# Patient Record
Sex: FEMALE | Race: WHITE | NOT HISPANIC OR LATINO | Employment: OTHER | URBAN - METROPOLITAN AREA
[De-identification: names, ages, dates, MRNs, and addresses within clinical notes are randomized per-mention and may not be internally consistent; named-entity substitution may affect disease eponyms.]

---

## 2018-01-11 NOTE — MISCELLANEOUS
History of Present Illness  TCM Communication [facility]: The patient is being contacted for follow-up after hospitalization  [place] [facility] [place]: 9/5/16  She was discharged to home  Medications reviewed and updated today  She refused a follow up appointment due to going toanother dr    Communication performed and completed by      Active Problems     1  Actinic keratosis (702 0) (L57 0)   2  Aortic valvar stenosis (424 1) (I35 0)   3  Aortic valve disorder (424 1) (I35 9)   4  Atrial fibrillation (427 31) (I48 91)   5  Bilateral lower extremity edema (782 3) (R60 0)   6  Cervical radiculitis (723 4) (M54 12)   7  Changing skin lesion (709 9) (L98 9)   8  Chest pain (786 50) (R07 9)   9  Chest tightness or pressure (786 59) (R07 89)   10  Chronic anticoagulation (V58 61) (Z79 01)   11  Cough (786 2) (R05)   12  Daytime sleepiness (780 54) (R40 0)   13  Dementia without behavioral disturbance, unspecified dementia type (294 20) (F03 90)   14  Diabetes mellitus type 2 with complications, uncontrolled (250 92) (E11 8,E11 65)   15  Dizziness (780 4) (R42)   16  Essential hypertension (401 9) (I10)   17  Fatigue (780 79) (R53 83)   18  Flu vaccine need (V04 81) (Z23)   19  Generalized pain (780 96) (R52)   20  Hypercholesterolemia (272 0) (E78 00)   21  Hyperlipidemia (272 4) (E78 5)   22  Hypokalemia (276 8) (E87 6)   23  Inflamed seborrheic keratosis (702 11) (L82 0)   24  Insomnia (780 52) (G47 00)   25  Mitral regurgitation (424 0) (I34 0)   26  Nocturnal enuresis (788 36) (N39 44)   27  Peripheral vascular disease (443 9) (I73 9)   28  Rotator cuff tear arthropathy of right shoulder (716 81) (M12 811)   29  Screening for diabetic retinopathy (V80 2) (Z13 5)   30  Screening for skin condition (V82 0) (Z13 89)   31  Seborrheic keratosis (702 19) (L82 1)   32  Shortness of breath (786 05) (R06 02)   33  Syncope and collapse (780 2) (R55)   34   Urinary tract infection (599 0) (N39 0) 35  Verruca vulgaris (078 10) (B07 9)   36  Viral warts (078 10) (B07 9)    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 (232 4) (D04 4)          Past Medical History    1  History of Abdominal pain, epigastric (789 06) (R10 13)   2  History of Abdominal pain, LLQ (left lower quadrant) (789 04) (R10 32)   3  History of Acute maxillary sinusitis (461 0) (J01 00)   4  History of Benign essential hypertension (401 1) (I10)   5  History of Benign neoplasm of skin of face (216 3) (D23 30)   6  History of Black tarry stools (578 1) (K92 1)   7  History of Cardiovascular Symptoms (785 9)   8  History of Cellulitis (682 9) (L03 90)   9  History of Cervicalgia (723 1) (M54 2)   10  History of Cough (786 2) (R05)   11  History of Dermatitis Factitia (698 4)   12  History of Diaphragmatic hernia (553 3) (K44 9)   13  History of Difficulty breathing (786 09) (R06 89)   14  History of Distressed Respirations (786 09)   15  History of Dysuria (788 1) (R30 0)   16  History of Elevation of level of transaminase or lactic acid dehydrogenase (LDH) (790 4)    (R74 0)   17  History of Esophagitis, reflux (530 11) (K21 0)   18  History of Flushing (782 62) (R23 2)   19  History of Functional murmur (R01 0)   20  History of Generalized Anxiety Disorder (V11 2)   21  History of Generalized Osteoarthritis Of The Hand (715 04)   22  H/O nonmelanoma skin cancer (V10 83) (Z85 828)   23  History of abdominal pain (V13 89) (Z87 898)   24  History of abscess of skin and subcutaneous tissue (V13 3) (Z87 2)   25  History of acute bronchitis (V12 69) (Z87 09)   26  History of allergic rhinitis (V12 69) (Z87 09)   27  History of aortic valve disorder (V12 59) (Z86 79)   28  History of backache (V13 59) (Z87 39)   29  History of candidiasis of mouth (V12 09) (Z86 19)   30  History of chest pain (V13 89) (Z87 898)   31  History of chronic fatigue syndrome (V13 89) (Z87 898)   32  History of constipation (V12 79) (Z87 19)   33   History of diarrhea (V12 79) (Z87 898)   34  History of diverticulitis of colon (V12 79) (Z87 19)   35  History of herpes simplex infection (V12 09) (Z86 19)   36  History of hyperlipidemia (V12 29) (Z86 39)   37  History of insomnia (V13 89) (Z87 898)   38  History of nausea (V12 79) (Z87 898)   39  History of nausea and vomiting (V12 79) (Z87 898)   40  History of neoplasm of uncertain behavior of skin (V13 3) (Z87 2)   41  History of osteoarthritis (V13 4) (Z87 39)   42  History of peripheral vascular disease (V12 59) (Z86 79)   43  History of seborrheic keratosis (V13 3) (Z87 2)   44  History of shortness of breath (V13 89) (Z87 898)   45  History of upper respiratory infection (V12 09) (Z87 09)   46  History of urinary frequency (V13 09) (Z87 898)   47  History of Lip Disorder (528 5)   48  History of Long term use of drug (V58 69) (Z79 899)   49  History of Long term use of drug (V58 69) (Z79 899)   50  History of Malaise (780 79) (R53 81)   51  History of Malaise (780 79) (R53 81)   52  History of Multiple Nonvenomous Insect Bites (919 4)   53  History of Need for prophylactic vaccination and inoculation against influenza (V04 81)    (Z23)   54  History of Open Wound (879 8)   55  History of Polyposis Coli Of The Large Intestine (V12 72)   56  History of Salivary secretion disturbance (527 7) (K11 7)   57  History of Screening for skin condition (V82 0) (Z13 89)   58  History of Shoulder joint pain, unspecified laterality   59  History of Squamous Cell Carcinoma Of The Skin Of The Neck (173 42)   60  History of Squamous Cell Carcinoma Of The Skin Of The Scalp (173 42)   61  History of Status post angioplasty (V45 89) (Z98 62)   62  History of Urinary Tract Infection, Pediatric Presentation (599 0)   63  History of Vertigo Of Central Origin (386 2)   64  History of Visit for pre-operative examination (V72 84) (Z01 818)   65  History of Vomiting (On Exam) (787 03)    Surgical History    1  History of Cataract Surgery   2   History of Knee Arthroscopy    Family History  Mother    1  Family history of cardiac disorder (V17 49) (Z82 49)   2  Family history of cerebral infarction (V17 1) (Z82 3)   3  Family history of diabetes mellitus (V18 0) (Z83 3)   4  Family history of hypertension (V17 49) (Z82 49)  Father    5  Family history of cardiac disorder (V17 49) (Z82 49)   6  Family history of cerebral infarction (V17 1) (Z82 3)   7  Family history of diabetes mellitus (V18 0) (Z83 3)   8  Family history of hypertension (V17 49) (Z82 49)  Grandmother    5  Family history of cardiac disorder (V17 49) (Z82 49)  Grandfather    10  Family history of cardiac disorder (V17 49) (Z82 49)  Family History    11  Family history of No Significant Family History    Social History    ·    · Never A Smoker   · Retired   · Social alcohol use (Z78 9)    Current Meds   1  Acetaminophen-Codeine #2 300-15 MG Oral Tablet; TAKE 1 TO 2 TABLETS EVERY 4   HOURS AS NEEDED; Therapy: 26BAD5400 to (Evaluate:13Gmz3108); Last Rx:00Vpt2646 Ordered   2  AmLODIPine Besylate 10 MG Oral Tablet; take 1 tablet by mouth every day; Therapy: 20JDP9741 to (Evaluate:04Jan2017)  Requested for: 51GDM2945; Last   Rx:10Uud4337 Ordered   3  Atenolol 100 MG Oral Tablet; TAKE 1 TABLET DAILY; Therapy: 42MCK0568 to (Evaluate:19Jun2016)  Requested for: 02Qmm6156; Last   Rx:52Ytd1782 Ordered   4  B-12 1000 MCG Oral Tablet Extended Release; Therapy: (Metro Ano) to Recorded   5  BD Insulin Syringe Ultrafine 31G X 15/64" 1 ML Miscellaneous; USE 3 TIMES A DAY AS   DIRECTED; Therapy: 33Ofw5103 to (Evaluate:52Jhd6339)  Requested for: 18IFF8102; Last   Rx:65Tbu5662 Ordered   6  CloNIDine HCl - 0 1 MG Oral Tablet; take 1 tablet by mouth twice daily; Therapy: 64YHL2693 to (Delgado Mckeon)  Requested for: 386 3694; Last   Rx:03Ksb8871 Ordered   7  [employer] Folic Acid 597 MCG Oral Tablet; TAKE 1 TABLET DAILY AS DIRECTED; Therapy: (Metro Ano) to Recorded   8   Donepezil HCl - 10 MG Oral Tablet; Take 1 tablet daily; Therapy: 35VUV1548 to (Evaluate:97Mov1968)  Requested for: 00Rxa7011; Last   Rx:48Kby6327 Ordered   9  Eliquis 5 MG Oral Tablet; take 1 tablet by mouth twice daily; Therapy: 31GBR8386 to (Evaluate:79Ehi8146)  Requested for: 29MOD4044; Last   Rx:53Agv9637 Ordered   10  Fluticasone Propionate 50 MCG/ACT Nasal Suspension; USE 2 SPRAYS IN EACH    NOSTRIL ONCE DAILY; Therapy: 55AEC2358 to (Last Rx:01Gzv1165)  Requested for: 15ANV0142 Ordered   11  Garlic 8580 MG CAPS; BID; Therapy: ([phone]) to Recorded   12  HumuLIN N 100 UNIT/ML Subcutaneous Suspension; inject 30 units in the am  40 units    in the pm;    Therapy: 39MTK4623 to (Last Rx:42Bch9141)  Requested for: 85KHE1780 Ordered   13  HumuLIN R 100 UNIT/ML Injection Solution; INJECT 40 UNITS SUBCUTANEOUSLY    BEFORE BREAKFAST, 25 UNITS BEFORE LUNCH, AND 30 UNITS BEFORE DINNER; Therapy: 21SJJ4313 to (Evaluate:11Hgd7389)  Requested for: 46QXF8212; Last    Rx:37Ipt5967 Ordered   14  HydroCHLOROthiazide 12 5 MG Oral Tablet; take 1 tablet by mouth every morning; Therapy: 92Xwe8476 to (Evaluate:94Anh6915)  Requested for: 80RBS8117; Last    Rx:00Zue9427 Ordered   15  Invokana 100 MG Oral Tablet; take 1 tablet by mouth every day; Therapy: 80YPR3526 to (Evaluate:69Brm9666)  Requested for: 28ZFP8155; Last    Rx:10Jbj1279 Ordered   16  Lisinopril 20 MG Oral Tablet; take 1 tablet by mouth twice a day; Therapy: 81XUX7244 to (Evaluate:21Mar2016)  Requested for: 05Hxk4015; Last    Rx:65Wod4963 Ordered   17  Medrol (Delroy) 4 MG TABS; TAKE AS DIRECTED ON PATIENT INSTRUCTION CARD; Therapy: 14KQS0918 to (Last Rx:81Hiu8127)  Requested for: 78XVY0572 Ordered   18  MetFORMIN HCl - 1000 MG Oral Tablet; take one tablet by mouth twice daily; Therapy: 90KQH1045 to (Evaluate:90Vww1171); Last Rx:82Vdb4168 Ordered   19  OneTouch Ultra Blue In Vitro Strip; TEST 3 TIMES DAILY;     Therapy: 47RUG9501 to (Barber Rodriguez) Requested for: 21KET9837; Last    Rx:24Epi8785 Ordered   20  Red Yeast Rice 600 MG Oral Capsule; Therapy: (Alise Galina) to Recorded   21  TraMADol HCl - 50 MG Oral Tablet; TAKE 1 TABLET BY MOUTH FOUR TIMES DAILY AS    NEEDED; Therapy: 27XJQ1034 to (Caro Allegra)  Requested for: 30Mha6290; Last    Rx:39Ddh1819; Status: ACTIVE - Renewal Denied Ordered   22  Vitamin B-6 100 MG Oral Tablet; TAKE 1 TABLET DAILY AS DIRECTED; Therapy: (Alise Galina) to Recorded   23  Vitamin D3 1000 UNIT Oral Tablet; Take 1 tablet daily; Therapy: (Alise Galina) to Recorded   24  Zolpidem Tartrate 10 MG Oral Tablet; TAKE 1 TABLET BY MOUTH EVERY NIGHT AT    BEDTIME AS NEEDED FOR INSOMNIA; Therapy: 81QEB9656 to (Evaluate:17Jun2016)  Requested for: 44IZJ3406; Last    Rx:37Iga1161 Ordered    Allergies    1  Hydrocodone-Acetaminophen CAPS   2  Statins    Health Management  Diabetes mellitus type 2 with complications, uncontrolled   (1) HEMOGLOBIN A1C; every 6 months; Last 09Jqv3405; Next Due: 78PYB0672; Near Due  (1) MICROALBUMIN CREATININE RATIO, RANDOM URINE; every 1 year; Next Due: 81REK4250; Overdue  *VB - Foot Exam; every 1 year; Last 07VCQ8400; Next Due: 41PFQ3806; Overdue  History of upper respiratory infection   Influenza (Split); every 1 year; Last 42Vld0824; Next Due: 18DAQ6766; Overdue  Screening for diabetic retinopathy   *VB - Eye Exam; every 1 year; Last 49SCM8200; Next Due: D7961259; Overdue  Health Maintenance   *VB - Urinary Incontinence Screen (Dx Z13 89 Screen for UI); every 1 year; Last 56Cnw9565; Next  Due: 41Mhm0341; Active  Medicare Annual Wellness Visit; every 1 year; Next Due: 59DAC0588; Overdue    Future Appointments    Date/Time Provider Specialty Site   10/27/2016 11:30 AM Sarah Uribe Nurse Schedule  [facility] [address]   02/20/2017 09:45 AM HECTOR Aldridge   Dermatology [facility]     Signatures   Electronically signed by : Rachell Cantor Mindy [facility]; Oct 20 2016  1:18PM EST                       (Author)    Electronically signed by : HECTOR Tello ; Oct 20 2016  5:23PM EST

## 2018-01-15 NOTE — RESULT NOTES
Verified Results  [address] [place] [address] [id number] 08:46AM Josias Earing Order Number: [id number]    - Patient Instructions: To schedule this appointment, please contact Central Scheduling at [phone]  Test Name Result Flag Reference   US ABDOMEN LIMITED (Report)     RIGHT UPPER QUADRANT ULTRASOUND     INDICATION: Abdominal distention  COMPARISON: None     TECHNIQUE:  Real-time ultrasound of the right upper quadrant was performed with a curvilinear transducer with both volumetric sweeps and still imaging techniques  FINDINGS:     PANCREAS: Visualized portions of the pancreas are within normal limits  AORTA AND IVC: Visualized portions are normal for patient age  LIVER:   Size: Borderline enlarged  The liver measures 17 3 cm in the midclavicular line  Contour: Surface contour is smooth  Parenchyma: Borderline increased echogenicity which could represent fatty infiltration  No evidence of suspicious mass  The main portal vein is patent and hepatopetal       BILIARY:   The gallbladder is normal in caliber  No wall thickening or pericholecystic fluid  No stones or sludge identified  No sonographic Leahy's sign  No intrahepatic biliary dilatation  CBD measures 6-7 mm  This is likely normal caliber for patient age  No choledocholithiasis  KIDNEY:    Right kidney measures 10 4 x 5 0 cm  No hydronephrosis or shadowing calculus  6 mm echogenic focus upper pole cortex, possibly angiomyolipoma (AML)  ASCITES:  None  IMPRESSION:     Borderline hepatomegaly and increased echogenicity which could reflect fatty infiltration  6 mm echogenic focus upper pole right kidney, possibly AML  Follow-up ultrasound in 6 months recommended         Workstation performed: [id number]     Signed by:   Liliam Yadav DO   10/28/16

## 2021-12-09 ENCOUNTER — TELEPHONE (OUTPATIENT)
Dept: GASTROENTEROLOGY | Facility: CLINIC | Age: 81
End: 2021-12-09